# Patient Record
(demographics unavailable — no encounter records)

---

## 2025-07-02 NOTE — ASSESSMENT
[FreeTextEntry1] : Impression: Lumbar radiculitis/mild arthritis left hip.  This patient will be treated with formal physical therapy along with a course of Mobic with GI precautions.  He will return if he is not improving in which case MRI may become necessary

## 2025-07-02 NOTE — HISTORY OF PRESENT ILLNESS
[de-identified] : This 73-year-old gentleman is seen today for evaluation of a 2-month history of pain in his left hip and left buttock region.  No obvious history of trauma precipitating event.  He does play golf 3 days/week.  His pain does not radiate beyond his thigh no numbness paresthesias motor weakness bladder or bowel dysfunction.  His general health since last seen has been stable.  Current medications include aspirin metformin simvastatin and Jardiance

## 2025-07-02 NOTE — PHYSICAL EXAM
Patient placed on continuous cardiac monitor, automatic blood pressure cuff and continuous pulse oximeter.   [de-identified] : Exam today reveals straight spine level pelvis symmetric leg lengths and minimal antalgic component of his gait on the left.  He has a stiff lumbar spine he has restricted motion in all planes with discomfort at the limits.  There is mild spasm more so in the left buttock in the region of the sciatic notch.  No obvious trigger points.  Evaluation of his hips reveals he does have restricted rotation and abduction on the left side with discomfort at the limits especially with passive motion.  Tenderness over the anterior hip capsule and thigh as well.  Straight leg raising is uncomfortable on the left though negative he is neurologically intact.  X-rays ordered and taken today of the lumbar spine as well as her left hip reviewed and interpreted by myself reveal multilevel degenerative disc disease in the lumbar spine facet arthritis and spur formation no lesion.  The left hip reveals mild arthritic change no lesion.